# Patient Record
Sex: FEMALE | Race: WHITE
[De-identification: names, ages, dates, MRNs, and addresses within clinical notes are randomized per-mention and may not be internally consistent; named-entity substitution may affect disease eponyms.]

---

## 2017-07-06 ENCOUNTER — HOSPITAL ENCOUNTER (OUTPATIENT)
Dept: HOSPITAL 62 - OD | Age: 82
End: 2017-07-06
Attending: PHYSICIAN ASSISTANT
Payer: MEDICARE

## 2017-07-06 DIAGNOSIS — M10.9: Primary | ICD-10-CM

## 2017-07-06 NOTE — RADIOLOGY REPORT (SQ)
EXAM DESCRIPTION:  FOOT RIGHT COMPLETE



COMPLETED DATE/TIME:  7/6/2017 5:07 pm



REASON FOR STUDY:  GOUT, UNSPECIFIED M10.9  GOUT, UNSPECIFIED



COMPARISON:  None.



NUMBER OF VIEWS:  Three views.



TECHNIQUE:  AP, lateral and oblique  radiographic images acquired of the right foot.



LIMITATIONS:  None.



FINDINGS:  MINERALIZATION: Normal.

BONES: Marginal erosion along the medial base right great toe proximal phalanx characteristic for gou
t.  No fracture

JOINTS: 1st metatarsophalangeal joint space narrowing.

SOFT TISSUES: Subcentimeter faintly radiopaque tophus along the medial aspect right 1st metatarsophal
angeal joint marked with an arrow on the AP view

OTHER: No other significant finding.



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



TECHNICAL DOCUMENTATION:  JOB ID:  [id number]

 2011 [employer]- All Rights Reserved

## 2019-02-06 ENCOUNTER — HOSPITAL ENCOUNTER (EMERGENCY)
Dept: HOSPITAL 62 - ER | Age: 84
Discharge: HOME | End: 2019-02-06
Payer: MEDICARE

## 2019-02-06 VITALS — SYSTOLIC BLOOD PRESSURE: 125 MMHG | DIASTOLIC BLOOD PRESSURE: 75 MMHG

## 2019-02-06 DIAGNOSIS — I44.7: ICD-10-CM

## 2019-02-06 DIAGNOSIS — R07.9: ICD-10-CM

## 2019-02-06 DIAGNOSIS — M54.9: ICD-10-CM

## 2019-02-06 DIAGNOSIS — J10.1: Primary | ICD-10-CM

## 2019-02-06 DIAGNOSIS — I48.91: ICD-10-CM

## 2019-02-06 DIAGNOSIS — R05: ICD-10-CM

## 2019-02-06 DIAGNOSIS — I10: ICD-10-CM

## 2019-02-06 LAB
A TYPE INFLUENZA AG: POSITIVE
ADD MANUAL DIFF: NO
ALBUMIN SERPL-MCNC: 3.7 G/DL (ref 3.5–5)
ALP SERPL-CCNC: 55 U/L (ref 38–126)
ALT SERPL-CCNC: 27 U/L (ref 9–52)
ANION GAP SERPL CALC-SCNC: 8 MMOL/L (ref 5–19)
AST SERPL-CCNC: 30 U/L (ref 14–36)
B INFLUENZA AG: NEGATIVE
BASOPHILS # BLD AUTO: 0 10^3/UL (ref 0–0.2)
BASOPHILS NFR BLD AUTO: 0.4 % (ref 0–2)
BILIRUB DIRECT SERPL-MCNC: 0.1 MG/DL (ref 0–0.4)
BILIRUB SERPL-MCNC: 0.5 MG/DL (ref 0.2–1.3)
BUN SERPL-MCNC: 12 MG/DL (ref 7–20)
CALCIUM: 9.5 MG/DL (ref 8.4–10.2)
CHLORIDE SERPL-SCNC: 101 MMOL/L (ref 98–107)
CO2 SERPL-SCNC: 26 MMOL/L (ref 22–30)
EOSINOPHIL # BLD AUTO: 0.1 10^3/UL (ref 0–0.6)
EOSINOPHIL NFR BLD AUTO: 1 % (ref 0–6)
ERYTHROCYTE [DISTWIDTH] IN BLOOD BY AUTOMATED COUNT: 13.1 % (ref 11.5–14)
GLUCOSE SERPL-MCNC: 109 MG/DL (ref 75–110)
HCT VFR BLD CALC: 38.1 % (ref 36–47)
HGB BLD-MCNC: 13.1 G/DL (ref 12–15.5)
LYMPHOCYTES # BLD AUTO: 0.5 10^3/UL (ref 0.5–4.7)
LYMPHOCYTES NFR BLD AUTO: 7.7 % (ref 13–45)
MCH RBC QN AUTO: 31.1 PG (ref 27–33.4)
MCHC RBC AUTO-ENTMCNC: 34.3 G/DL (ref 32–36)
MCV RBC AUTO: 91 FL (ref 80–97)
MONOCYTES # BLD AUTO: 0.7 10^3/UL (ref 0.1–1.4)
MONOCYTES NFR BLD AUTO: 11.9 % (ref 3–13)
NEUTROPHILS # BLD AUTO: 4.9 10^3/UL (ref 1.7–8.2)
NEUTS SEG NFR BLD AUTO: 79 % (ref 42–78)
PLATELET # BLD: 179 10^3/UL (ref 150–450)
POTASSIUM SERPL-SCNC: 4.4 MMOL/L (ref 3.6–5)
PROT SERPL-MCNC: 6.3 G/DL (ref 6.3–8.2)
RBC # BLD AUTO: 4.21 10^6/UL (ref 3.72–5.28)
SODIUM SERPL-SCNC: 135.1 MMOL/L (ref 137–145)
TOTAL CELLS COUNTED % (AUTO): 100 %
WBC # BLD AUTO: 6.1 10^3/UL (ref 4–10.5)

## 2019-02-06 PROCEDURE — 96360 HYDRATION IV INFUSION INIT: CPT

## 2019-02-06 PROCEDURE — 80048 BASIC METABOLIC PNL TOTAL CA: CPT

## 2019-02-06 PROCEDURE — 99283 EMERGENCY DEPT VISIT LOW MDM: CPT

## 2019-02-06 PROCEDURE — 87040 BLOOD CULTURE FOR BACTERIA: CPT

## 2019-02-06 PROCEDURE — 71046 X-RAY EXAM CHEST 2 VIEWS: CPT

## 2019-02-06 PROCEDURE — 87804 INFLUENZA ASSAY W/OPTIC: CPT

## 2019-02-06 PROCEDURE — 84484 ASSAY OF TROPONIN QUANT: CPT

## 2019-02-06 PROCEDURE — 85025 COMPLETE CBC W/AUTO DIFF WBC: CPT

## 2019-02-06 PROCEDURE — 93010 ELECTROCARDIOGRAM REPORT: CPT

## 2019-02-06 PROCEDURE — 96361 HYDRATE IV INFUSION ADD-ON: CPT

## 2019-02-06 PROCEDURE — 93005 ELECTROCARDIOGRAM TRACING: CPT

## 2019-02-06 PROCEDURE — 83605 ASSAY OF LACTIC ACID: CPT

## 2019-02-06 PROCEDURE — 36415 COLL VENOUS BLD VENIPUNCTURE: CPT

## 2019-02-06 PROCEDURE — 80076 HEPATIC FUNCTION PANEL: CPT

## 2019-02-06 NOTE — ER DOCUMENT REPORT
ED General





- General


Chief Complaint: Chest Pain


Stated Complaint: CHEST PAIN


Time Seen by Provider: 02/06/19 09:57


Primary Care Provider: 


PETE MIKE PA [Primary Care Provider] - Follow up as needed


TRAVEL OUTSIDE OF THE [place] IN LAST 30 DAYS: No





- HPI


Notes: 





Patient is a 84-year-old female that presents to the emergency department for 

chief complaint of cough, chest pain and back pain.


Patient reports on Monday she choked while eating, later that evening she 

started coughing.  She states she has had increasing coughing and congestion 

since then.  She denied any known fevers but was told she was febrile when she 

was here today.  Patient states she tried to see her doctor yesterday for the 

symptoms but was unable to get an appointment.  She reports an aching pain in 

her diffuse anterior chest and posterior upper back worse with coughing.  She 

states the pain significantly improves when she is not coughing.  She denies any

nausea, vomiting, abdominal pain.  She did not get an influenza vaccine this 

year.  She denies any sick contacts.  Patient's family is also concerned that 

for the last month she has had decreased mobility because of gout in both of her

feet.  She is currently taking her allopurinol for the gout.  Patient has not 

taken any of her morning medications yet today.





Past Medical History: Hypertension, atrial fibrillation





Past Surgical History: Right arm fracture repair





Social History: Denies drugs alcohol and tobacco





Family History: Reviewed and noncontributory for presenting illness





Allergies: Reviewed, see documented allergy list.








REVIEW OF SYSTEMS:





CONSTITUTIONAL : 





No fever





 chills





No diaphoresis





No recent illness





EENT:





No vision changes





congestion





No sore throat  





CARDIOVASCULAR:





 chest pain





No palpitations





RESPIRATORY:





shortness of breath





cough





difficulty breathing





GASTROINTESTINAL: 





No abdominal pain





No nausea





No vomiting





No diarrhea





GENITOURINARY:





No dysuria





No hematuria





No difficulty urinating





MUSCULOSKELETAL:





back pain





No leg pain





No arm pain





SKIN:  





No rashes





No lesions





LYMPHATIC: 





No swollen, enlarged glands.





NEUROLOGICAL: 





No lightheadedness





No headache





No weakness





No paresthesias





PSYCHIATRIC:





No anxiety





No depression 








PHYSICAL EXAMINATION:





Vital signs reviewed, nursing noted reviewed.





GENERAL: Well-appearing, well-nourished and in no acute distress.





HEAD: Atraumatic, normocephalic.





EYES: Eyes appear normal, extraocular movements intact, sclera anicteric, 

conjunctiva are normal.





ENT: nares patent, oropharynx clear without exudates.  Mildly dry mucous 

membranes.





NECK: Normal range of motion, supple without lymphadenopathy





LUNGS: Breath sounds diminished to auscultation bilaterally and equal.  No 

wheezes rales or rhonchi.





HEART: Irregularly irregular and tachycardic rhythm without murmurs, +2/4 

bilateral radial and DP pulses





ABDOMEN: Soft, nontender, normoactive bowel sounds.  No rebound, guarding, or 

rigidity. No masses appreciated.





EXTREMITIES: Nontender, good range of motion, no pitting or edema. 





NEUROLOGICAL: No focal neurological deficits. Moves all extremities 

spontaneously Motor and sensory grossly intact on exam.  Mild diffuse tenderness

to palpation of her feet bilaterally with no focal erythema or edema.





PSYCH: Normal mood, normal affect.





SKIN: Warm, Dry, normal turgor, no rashes or lesions noted on exposed skin











- Related Data


Allergies/Adverse Reactions: 


                                        





No Known Allergies Allergy (Verified 02/06/19 08:08)


   











Past Medical History





- Social History


Smoking Status: Unknown if Ever Smoked


Family History: Reviewed & Not Pertinent


Patient has suicidal ideation: No


Patient has homicidal ideation: No





- Past Medical History


Cardiac Medical History: Reports: Hx Hypertension


   Denies: Hx Heart Attack


Pulmonary Medical History: 


   Denies: Hx Asthma


Neurological Medical History: Denies: Hx Cerebrovascular Accident, Hx Seizures


Renal/ Medical History: Denies: Hx Peritoneal Dialysis


GI Medical History: Denies: Hx Hepatitis, Hx Ulcer


Musculoskeletal Medical History: 


Infectious Medical History: Denies: Hx Hepatitis


Past Surgical History: Denies: Hx Mastectomy, Hx Open Heart Surgery, Hx 

Pacemaker





- Immunizations


Hx Diphtheria, Pertussis, Tetanus Vaccination: Yes





Physical Exam





- Vital signs


Vitals: 


                                        











Temp Pulse Resp BP Pulse Ox


 


 101.4 F H  100   16   151/71 H  100 


 


 02/06/19 08:09  02/06/19 08:09  02/06/19 08:09  02/06/19 08:09  02/06/19 08:09














Course





- Re-evaluation


Re-evalutation: 





02/06/19 10:33


Vitals reviewed.  Nursing notes reviewed.  Patient is tachycardic and febrile.  

She will be given IV fluids and Tylenol for symptom medic management.


02/06/19 12:32





Laboratory











  02/06/19 02/06/19 02/06/19





  09:44 09:44 09:44


 


WBC  6.1  


 


RBC  4.21  


 


Hgb  13.1  


 


Hct  38.1  


 


MCV  91  


 


MCH  31.1  


 


MCHC  34.3  


 


RDW  13.1  


 


Plt Count  179  


 


Seg Neutrophils %  79.0 H  


 


Lymphocytes %  7.7 L  


 


Monocytes %  11.9  


 


Eosinophils %  1.0  


 


Basophils %  0.4  


 


Absolute Neutrophils  4.9  


 


Absolute Lymphocytes  0.5  


 


Absolute Monocytes  0.7  


 


Absolute Eosinophils  0.1  


 


Absolute Basophils  0.0  


 


Sodium   Cancelled 


 


Potassium   Cancelled 


 


Chloride   Cancelled 


 


Carbon Dioxide   Cancelled 


 


Anion Gap   Cancelled 


 


BUN   Cancelled 


 


Creatinine   Cancelled 


 


Est GFR ( Amer)   Cancelled 


 


Est GFR (Non-Af Amer)   Cancelled 


 


Glucose   Cancelled 


 


Lactic Acid   


 


Calcium   Cancelled 


 


Total Bilirubin   Cancelled 


 


Direct Bilirubin   Cancelled 


 


Neonat Total Bilirubin   Cancelled 


 


Neonat Direct Bilirubin   Cancelled 


 


Neonat Indirect Bili   Cancelled 


 


AST   Cancelled 


 


ALT   Cancelled 


 


Alkaline Phosphatase   Cancelled 


 


Troponin I    < 0.012


 


Total Protein   Cancelled 


 


Albumin   Cancelled 


 


Influenza A (Rapid)   


 


Influenza B (Rapid)   














  02/06/19 02/06/19 02/06/19





  10:20 10:20 10:51


 


WBC   


 


RBC   


 


Hgb   


 


Hct   


 


MCV   


 


MCH   


 


MCHC   


 


RDW   


 


Plt Count   


 


Seg Neutrophils %   


 


Lymphocytes %   


 


Monocytes %   


 


Eosinophils %   


 


Basophils %   


 


Absolute Neutrophils   


 


Absolute Lymphocytes   


 


Absolute Monocytes   


 


Absolute Eosinophils   


 


Absolute Basophils   


 


Sodium    135.1 L


 


Potassium    4.4


 


Chloride    101


 


Carbon Dioxide    26


 


Anion Gap    8


 


BUN    12


 


Creatinine    1.00


 


Est GFR ( Amer)    > 60


 


Est GFR (Non-Af Amer)    53 L


 


Glucose    109


 


Lactic Acid  0.9  


 


Calcium    9.5


 


Total Bilirubin    0.5


 


Direct Bilirubin    0.1


 


Neonat Total Bilirubin    Not Reportable


 


Neonat Direct Bilirubin    Not Reportable


 


Neonat Indirect Bili    Not Reportable


 


AST    30


 


ALT    27


 


Alkaline Phosphatase    55


 


Troponin I   


 


Total Protein    6.3


 


Albumin    3.7


 


Influenza A (Rapid)   POSITIVE 


 


Influenza B (Rapid)   NEGATIVE 











                                        





Chest X-Ray  02/06/19 09:57


IMPRESSION:  Cardiomegaly without acute abnormality of the lungs.  No focal 

airspace opacity.


 








Patient reevaluated.  She is oxygenating well on room air and in no respiratory 

distress.  Her heart rate has been varying between the upper 90s-130s.  She is 

in atrial fibrillation which is chronic for her.  Patient will be given a dose 

of her Bystolic for further heart rate control.  She was offered admission to 

the hospital for telemetry monitoring of her tachycardia but has declined.  She 

is otherwise hemodynamically stable.  Her workup shows influenza a however she 

is outside the window for Tamiflu.  She was counseled on symptomatic management 

including hydration and Tylenol at home.  She was encouraged to follow with her 

primary care doctor tomorrow for reevaluation of her tachycardia.  She will also

monitor her heart rate at home and will return if she is persistently tachycard

ic greater than 120 or if she becomes symptomatic including palpitations, 

shortness of breath and lightheadedness.  Patient's family is in agreement with 

plan of care and will assist her this evening.  She is stable at discharge.





- Vital Signs


Vital signs: 


                                        











Temp Pulse Resp BP Pulse Ox


 


 101.4 F H  100   16   151/71 H  100 


 


 02/06/19 08:09  02/06/19 08:09  02/06/19 08:09  02/06/19 08:09  02/06/19 08:09














- Laboratory


Result Diagrams: 


                                 02/06/19 09:44





                                 02/06/19 10:51


Laboratory results interpreted by me: 


                                        











  02/06/19 02/06/19





  09:44 10:51


 


Seg Neutrophils %  79.0 H 


 


Lymphocytes %  7.7 L 


 


Sodium   135.1 L


 


Est GFR (Non-Af Amer)   53 L














- EKG Interpretation by Me


Additional EKG results interpreted by me: 





02/06/19 10:33


Interpreted by myself


0815: Atrial fibrillation with RVR, rate 100, normal axis, left bundle branch 

block





Discharge





- Discharge


Clinical Impression: 


 Influenza A





Condition: Stable


Disposition: HOME, SELF-CARE


Instructions:  Influenza ([facility]) [id number]


Additional Instructions: 


Please return to the emergency department if you have any worsening, or concern 

of your symptoms.





Please return to the emergency department if you develop chest pain, difficulty 

breathing, severe abdominal pain, or ongoing vomiting.





Please follow-up with your primary care physician tomorrow.





If prescribed, take all medications as directed. 





If you have any questions or concerns do not hesitate to return the emergency 

department for evaluation.





Your heart rate was elevated today because of fever and your atrial 

fibrillation.  You were given your dose of Bystolic in the emergency room and do

not need to take this home medication again this evening.  Please check your 

heart rate at home and if you are having heart rates consistently above 120-130 

you should return to the emergency room.  Return to the emergency room also if 

you develop any palpitations, increasing shortness of breath, lightheadedness or

difficulty breathing.





Referrals: 


PETE MIKE PA [Primary Care Provider] - Follow up tomorrow

## 2019-02-06 NOTE — EKG REPORT
SEVERITY:- ABNORMAL ECG -

ATRIAL FIBRILLATION, V-RATE 

LEFT BUNDLE BRANCH BLOCK

:

Confirmed by: Carolyn Spencer 06-Feb-2019 21:04:31

## 2019-02-06 NOTE — RADIOLOGY REPORT (SQ)
EXAM DESCRIPTION:  CHEST 2 VIEWS



COMPLETED DATE/TIME:  2/6/2019 11:23 am



REASON FOR STUDY:  cough



COMPARISON:  None.



EXAM PARAMETERS:  NUMBER OF VIEWS: two views

TECHNIQUE: Digital Frontal and Lateral radiographic views of the chest acquired.

RADIATION DOSE: NA

LIMITATIONS: none



FINDINGS:  LUNGS AND PLEURA: No opacities, masses or pneumothorax. No pleural effusion.

MEDIASTINUM AND HILAR STRUCTURES: No masses or contour abnormalities.

HEART AND VASCULAR STRUCTURES: Cardiomegaly.

BONES: Partially imaged plate and screw fixation of the proximal right humerus.

HARDWARE: None in the chest.

OTHER: No other significant finding.



IMPRESSION:  Cardiomegaly without acute abnormality of the lungs.  No focal airspace opacity.



TECHNICAL DOCUMENTATION:  JOB ID:  [id number]

 2011 [employer]- All Rights Reserved



Reading location - IP/workstation name: [email or web address]

## 2019-10-17 ENCOUNTER — HOSPITAL ENCOUNTER (EMERGENCY)
Dept: HOSPITAL 62 - ER | Age: 84
Discharge: LEFT BEFORE DISCHARGE | End: 2019-10-17
Payer: MEDICARE

## 2019-10-17 VITALS — DIASTOLIC BLOOD PRESSURE: 66 MMHG | SYSTOLIC BLOOD PRESSURE: 181 MMHG

## 2019-10-17 DIAGNOSIS — R51: ICD-10-CM

## 2019-10-17 DIAGNOSIS — Z53.21: Primary | ICD-10-CM

## 2019-10-17 DIAGNOSIS — Z79.01: ICD-10-CM

## 2019-10-17 LAB
ADD MANUAL DIFF: NO
ALBUMIN SERPL-MCNC: 4.4 G/DL (ref 3.5–5)
ALP SERPL-CCNC: 62 U/L (ref 38–126)
ANION GAP SERPL CALC-SCNC: 9 MMOL/L (ref 5–19)
APTT BLD: 38.8 SEC (ref 23.5–35.8)
AST SERPL-CCNC: 34 U/L (ref 14–36)
BASOPHILS # BLD AUTO: 0.1 10^3/UL (ref 0–0.2)
BASOPHILS NFR BLD AUTO: 0.7 % (ref 0–2)
BILIRUB DIRECT SERPL-MCNC: 0.2 MG/DL (ref 0–0.4)
BILIRUB SERPL-MCNC: 0.4 MG/DL (ref 0.2–1.3)
BUN SERPL-MCNC: 19 MG/DL (ref 7–20)
CALCIUM: 11 MG/DL (ref 8.4–10.2)
CHLORIDE SERPL-SCNC: 102 MMOL/L (ref 98–107)
CO2 SERPL-SCNC: 28 MMOL/L (ref 22–30)
EOSINOPHIL # BLD AUTO: 0.1 10^3/UL (ref 0–0.6)
EOSINOPHIL NFR BLD AUTO: 1.5 % (ref 0–6)
ERYTHROCYTE [DISTWIDTH] IN BLOOD BY AUTOMATED COUNT: 13.4 % (ref 11.5–14)
GLUCOSE SERPL-MCNC: 91 MG/DL (ref 75–110)
HCT VFR BLD CALC: 40.8 % (ref 36–47)
HGB BLD-MCNC: 13.8 G/DL (ref 12–15.5)
INR PPP: 1.62
LYMPHOCYTES # BLD AUTO: 1.6 10^3/UL (ref 0.5–4.7)
LYMPHOCYTES NFR BLD AUTO: 21.9 % (ref 13–45)
MCH RBC QN AUTO: 31.8 PG (ref 27–33.4)
MCHC RBC AUTO-ENTMCNC: 33.7 G/DL (ref 32–36)
MCV RBC AUTO: 94 FL (ref 80–97)
MONOCYTES # BLD AUTO: 0.6 10^3/UL (ref 0.1–1.4)
MONOCYTES NFR BLD AUTO: 8.8 % (ref 3–13)
NEUTROPHILS # BLD AUTO: 4.9 10^3/UL (ref 1.7–8.2)
NEUTS SEG NFR BLD AUTO: 67.1 % (ref 42–78)
PLATELET # BLD: 199 10^3/UL (ref 150–450)
POTASSIUM SERPL-SCNC: 4.4 MMOL/L (ref 3.6–5)
PROT SERPL-MCNC: 7.6 G/DL (ref 6.3–8.2)
PROTHROMBIN TIME: 19.4 SEC (ref 11.4–15.4)
RBC # BLD AUTO: 4.33 10^6/UL (ref 3.72–5.28)
TOTAL CELLS COUNTED % (AUTO): 100 %
WBC # BLD AUTO: 7.3 10^3/UL (ref 4–10.5)

## 2019-10-17 PROCEDURE — 85025 COMPLETE CBC W/AUTO DIFF WBC: CPT

## 2019-10-17 PROCEDURE — 85730 THROMBOPLASTIN TIME PARTIAL: CPT

## 2019-10-17 PROCEDURE — 80053 COMPREHEN METABOLIC PANEL: CPT

## 2019-10-17 PROCEDURE — 36415 COLL VENOUS BLD VENIPUNCTURE: CPT

## 2019-10-17 PROCEDURE — 85610 PROTHROMBIN TIME: CPT

## 2019-10-17 PROCEDURE — 70450 CT HEAD/BRAIN W/O DYE: CPT

## 2019-10-17 NOTE — RADIOLOGY REPORT (SQ)
EXAM DESCRIPTION:  CT HEAD WITHOUT



COMPLETED DATE/TIME:  10/17/2019 6:34 pm



REASON FOR STUDY:  Fall hit head on Eliquis



COMPARISON:  None.



TECHNIQUE:  Axial images acquired through the brain without intravenous contrast.  Images reviewed wi
th bone, brain and subdural windows.  Additional sagittal and coronal reconstructions were generated.
 Images stored on PACS.

All CT scanners at this facility use dose modulation, iterative reconstruction, and/or weight based d
osing when appropriate to reduce radiation dose to as low as reasonably achievable (ALARA).

[facility]: Dose Right  [facility]: CareDose    [facility]: Dose Right    [facility]: Teradose 4D    [facility]: [employer]



RADIATION DOSE:  CT Rad equipment meets quality standard of care and radiation dose reduction techniq
ues were employed. CTDIvol: 53.2 mGy. DLP: 964 mGy-cm. mGy.



LIMITATIONS:  None.



FINDINGS:  VENTRICLES: Prominent ventricles secondary to involutional atrophy.

CEREBRUM: No masses.  No hemorrhage.  No midline shift.  No evidence for acute infarction. Normal gra
y/white matter differentiation. No areas of low density in the white matter.

CEREBELLUM: No masses.  No hemorrhage.  No alteration of density.  No evidence for acute infarction.

EXTRAAXIAL SPACES: No abnormal fluid collections.  Empty sella.  No masses.

ORBITS AND GLOBE: No intra- or extraconal masses.  Normal contour of globe without masses.  Cataract 
surgery.

CALVARIUM: No fracture.

PARANASAL SINUSES: Right maxillary mucous retention cyst.

SOFT TISSUES: No mass or hematoma.

OTHER: No other significant finding.



IMPRESSION:  NO ACUTE INTRACRANIAL IMAGING FINDINGS.

EVIDENCE OF ACUTE STROKE: NO.



COMMENT:  Quality ID # 436: Final reports with documentation of one or more dose reduction techniques
 (e.g., Automated exposure control, adjustment of the mA and/or kV according to patient size, use of 
iterative reconstruction technique)



TECHNICAL DOCUMENTATION:  JOB ID:  [id number]

 2011 [employer]- All Rights Reserved



Reading location - IP/workstation name: [email or web address]

## 2019-10-17 NOTE — RADIOLOGY REPORT (SQ)
EXAM DESCRIPTION:  HAND LEFT 3 VIEWS



COMPLETED DATE/TIME:  10/17/2019 6:16 pm



REASON FOR STUDY:  Fall injury to left hand and left hip



COMPARISON:  None.



EXAM PARAMETERS:  NUMBER OF VIEWS: Three views.

TECHNIQUE: AP, lateral and oblique  radiographic images acquired of the left hand.

LIMITATIONS: None.



FINDINGS:  MINERALIZATION: Normal.

BONES: No acute fracture or dislocation.  No worrisome bone lesions.

JOINTS: No effusions.  Osteoarthritic degenerative changes of the interphalangeal and 1st CMC joints.


SOFT TISSUES: No soft tissue swelling.  No foreign body.

OTHER: No other significant finding.



IMPRESSION:  NO RADIOGRAPHIC EVIDENCE OF ACUTE INJURY.



TECHNICAL DOCUMENTATION:  JOB ID:  [id number]

 2011 [employer]- All Rights Reserved



Reading location - IP/workstation name: [email or web address]

## 2019-10-17 NOTE — ER DOCUMENT REPORT
ED Medical Screen (RME)





- General


Chief Complaint: Fall


Stated Complaint: FALL/HEAD PAIN


Time Seen by Provider: 10/17/19 17:39


Primary Care Provider: 


PETE MIKE PA [Primary Care Provider] - Follow up as needed


Mode of Arrival: Wheelchair


Information source: Patient


Notes: 





85-year-old female presented to ED for fall landing on her hip hand and her 

head.  She is on Eliquis.  She was to her primary care doctor yesterday her 

heart rate is running in the 50s.  She states she has a Baker's cyst in the 

opposite knee and has had pain in the opposite hip but not on the side.  She 

states she also had no feeling in the right hip at the time she fell.




















I have greeted and performed a rapid initial assessment of this patient.  A 

comprehensive ED assessment and evaluation of the patient, analysis of test 

results and completion of medical decision making process will be conducted by 

an additional ED providers.


TRAVEL OUTSIDE OF THE [place] IN LAST 30 DAYS: No





- Related Data


Allergies/Adverse Reactions: 


                                        





No Known Allergies Allergy (Verified 02/06/19 08:08)


   











Past Medical History





- Past Medical History


Cardiac Medical History: Reports: Hx Hypertension


   Denies: Hx Heart Attack


Pulmonary Medical History: 


   Denies: Hx Asthma


Neurological Medical History: Denies: Hx Cerebrovascular Accident, Hx Seizures


Renal/ Medical History: Denies: Hx Peritoneal Dialysis


GI Medical History: Denies: Hx Hepatitis, Hx Ulcer


Musculoskeltal Medical History: 


Infectious Medical History: Denies: Hx Hepatitis


Past Surgical History: Denies: Hx Mastectomy, Hx Open Heart Surgery, Hx 

Pacemaker





- Immunizations


Hx Diphtheria, Pertussis, Tetanus Vaccination: Yes





Physical Exam





- Vital signs


Vitals: 





                                        











Temp Pulse Resp BP Pulse Ox


 


 97.5 F   55 L  16   181/66 H  97 


 


 10/17/19 17:36  10/17/19 17:36  10/17/19 17:36  10/17/19 17:36  10/17/19 17:36














Course





- Vital Signs


Vital signs: 





                                        











Temp Pulse Resp BP Pulse Ox


 


 97.5 F   55 L  16   181/66 H  97 


 


 10/17/19 17:36  10/17/19 17:36  10/17/19 17:36  10/17/19 17:36  10/17/19 17:36














Doctor's Discharge





- Discharge


Referrals: 


RASHID,PETE, PA [Primary Care Provider] - Follow up as needed

## 2019-10-17 NOTE — RADIOLOGY REPORT (SQ)
EXAM DESCRIPTION:  KNEE RIGHT 4 VIEWS



COMPLETED DATE/TIME:  10/17/2019 6:16 pm



REASON FOR STUDY:  FALL



COMPARISON:  Right knee radiographs 8/7/2008



NUMBER OF VIEWS:  Four views.



TECHNIQUE:  AP, lateral, and both oblique radiographic images acquired of the right knee.



LIMITATIONS:  None.



FINDINGS:  MINERALIZATION: Normal.

BONES: No acute fracture or dislocation.  No worrisome bone lesions.

JOINT: No effusion.  Mild osteoarthritis.

SOFT TISSUES: No soft tissue swelling.  No radio-opaque foreign body.

OTHER: No other significant finding.



IMPRESSION:  NO RADIOGRAPHIC EVIDENCE OF ACUTE INJURY.



TECHNICAL DOCUMENTATION:  JOB ID:  [id number]

 2011 [employer]- All Rights Reserved



Reading location - IP/workstation name: [email or web address]

## 2019-12-27 ENCOUNTER — HOSPITAL ENCOUNTER (OUTPATIENT)
Dept: HOSPITAL 62 - OD | Age: 84
End: 2019-12-27
Attending: PHYSICIAN ASSISTANT
Payer: MEDICARE

## 2019-12-27 DIAGNOSIS — R05: Primary | ICD-10-CM

## 2019-12-27 LAB
ADD MANUAL DIFF: NO
BASOPHILS # BLD AUTO: 0 10^3/UL (ref 0–0.2)
BASOPHILS NFR BLD AUTO: 0.8 % (ref 0–2)
EOSINOPHIL # BLD AUTO: 0.2 10^3/UL (ref 0–0.6)
EOSINOPHIL NFR BLD AUTO: 3.4 % (ref 0–6)
ERYTHROCYTE [DISTWIDTH] IN BLOOD BY AUTOMATED COUNT: 13.5 % (ref 11.5–14)
HCT VFR BLD CALC: 37.8 % (ref 36–47)
HGB BLD-MCNC: 12.8 G/DL (ref 12–15.5)
LYMPHOCYTES # BLD AUTO: 1.9 10^3/UL (ref 0.5–4.7)
LYMPHOCYTES NFR BLD AUTO: 31.8 % (ref 13–45)
MCH RBC QN AUTO: 31.8 PG (ref 27–33.4)
MCHC RBC AUTO-ENTMCNC: 33.9 G/DL (ref 32–36)
MCV RBC AUTO: 94 FL (ref 80–97)
MONOCYTES # BLD AUTO: 0.5 10^3/UL (ref 0.1–1.4)
MONOCYTES NFR BLD AUTO: 8.3 % (ref 3–13)
NEUTROPHILS # BLD AUTO: 3.3 10^3/UL (ref 1.7–8.2)
NEUTS SEG NFR BLD AUTO: 55.7 % (ref 42–78)
PLATELET # BLD: 181 10^3/UL (ref 150–450)
RBC # BLD AUTO: 4.03 10^6/UL (ref 3.72–5.28)
TOTAL CELLS COUNTED % (AUTO): 100 %
WBC # BLD AUTO: 5.9 10^3/UL (ref 4–10.5)

## 2019-12-27 PROCEDURE — 85025 COMPLETE CBC W/AUTO DIFF WBC: CPT

## 2019-12-27 PROCEDURE — 71046 X-RAY EXAM CHEST 2 VIEWS: CPT

## 2019-12-27 PROCEDURE — 36415 COLL VENOUS BLD VENIPUNCTURE: CPT

## 2019-12-27 NOTE — RADIOLOGY REPORT (SQ)
EXAM DESCRIPTION:  CHEST PA/LATERAL



COMPLETED DATE/TIME:  12/27/2019 1:42 pm



REASON FOR STUDY:  COUGH



COMPARISON:  2/6/2019



EXAM PARAMETERS:  NUMBER OF VIEWS: two views

TECHNIQUE: Digital Frontal and Lateral radiographic views of the chest acquired.

RADIATION DOSE: NA

LIMITATIONS: none



FINDINGS:  LUNGS AND PLEURA: No opacities, masses or pneumothorax. No pleural effusion.

MEDIASTINUM AND HILAR STRUCTURES: No masses or contour abnormalities.

HEART AND VASCULAR STRUCTURES: Cardiomegaly is present.  There is no pulmonary edema.

BONES: No acute findings.

HARDWARE: None in the chest.

OTHER: No other significant finding.



IMPRESSION:  Cardiomegaly without pulmonary edema.



TECHNICAL DOCUMENTATION:  JOB ID:  [id number]

 2011 [employer]- All Rights Reserved



Reading location - IP/workstation name: [email or web address]

## 2020-01-09 ENCOUNTER — HOSPITAL ENCOUNTER (EMERGENCY)
Dept: HOSPITAL 62 - ER | Age: 85
LOS: 2 days | Discharge: OTHER INSTITUTION | End: 2020-01-11
Payer: MEDICARE

## 2020-01-09 DIAGNOSIS — R07.9: ICD-10-CM

## 2020-01-09 DIAGNOSIS — R68.83: ICD-10-CM

## 2020-01-09 DIAGNOSIS — I44.7: ICD-10-CM

## 2020-01-09 DIAGNOSIS — I10: ICD-10-CM

## 2020-01-09 DIAGNOSIS — R00.2: Primary | ICD-10-CM

## 2020-01-09 LAB
ADD MANUAL DIFF: NO
ALBUMIN SERPL-MCNC: 4.3 G/DL (ref 3.5–5)
ALP SERPL-CCNC: 56 U/L (ref 38–126)
ANION GAP SERPL CALC-SCNC: 10 MMOL/L (ref 5–19)
AST SERPL-CCNC: 33 U/L (ref 14–36)
BASOPHILS # BLD AUTO: 0 10^3/UL (ref 0–0.2)
BASOPHILS NFR BLD AUTO: 0.7 % (ref 0–2)
BILIRUB DIRECT SERPL-MCNC: 0.2 MG/DL (ref 0–0.4)
BILIRUB SERPL-MCNC: 0.5 MG/DL (ref 0.2–1.3)
BUN SERPL-MCNC: 16 MG/DL (ref 7–20)
CALCIUM: 10.8 MG/DL (ref 8.4–10.2)
CHLORIDE SERPL-SCNC: 103 MMOL/L (ref 98–107)
CK MB SERPL-MCNC: 3.45 NG/ML (ref ?–4.55)
CK SERPL-CCNC: 106 U/L (ref 30–135)
CO2 SERPL-SCNC: 27 MMOL/L (ref 22–30)
EOSINOPHIL # BLD AUTO: 0.1 10^3/UL (ref 0–0.6)
EOSINOPHIL NFR BLD AUTO: 2.6 % (ref 0–6)
ERYTHROCYTE [DISTWIDTH] IN BLOOD BY AUTOMATED COUNT: 14 % (ref 11.5–14)
GLUCOSE SERPL-MCNC: 99 MG/DL (ref 75–110)
HCT VFR BLD CALC: 39.8 % (ref 36–47)
HGB BLD-MCNC: 13.4 G/DL (ref 12–15.5)
LYMPHOCYTES # BLD AUTO: 1.5 10^3/UL (ref 0.5–4.7)
LYMPHOCYTES NFR BLD AUTO: 25.5 % (ref 13–45)
MCH RBC QN AUTO: 32 PG (ref 27–33.4)
MCHC RBC AUTO-ENTMCNC: 33.8 G/DL (ref 32–36)
MCV RBC AUTO: 95 FL (ref 80–97)
MONOCYTES # BLD AUTO: 0.5 10^3/UL (ref 0.1–1.4)
MONOCYTES NFR BLD AUTO: 8.5 % (ref 3–13)
NEUTROPHILS # BLD AUTO: 3.6 10^3/UL (ref 1.7–8.2)
NEUTS SEG NFR BLD AUTO: 62.7 % (ref 42–78)
PLATELET # BLD: 189 10^3/UL (ref 150–450)
POTASSIUM SERPL-SCNC: 4.6 MMOL/L (ref 3.6–5)
PROT SERPL-MCNC: 7.6 G/DL (ref 6.3–8.2)
RBC # BLD AUTO: 4.21 10^6/UL (ref 3.72–5.28)
TOTAL CELLS COUNTED % (AUTO): 100 %
TROPONIN I SERPL-MCNC: < 0.012 NG/ML
WBC # BLD AUTO: 5.7 10^3/UL (ref 4–10.5)

## 2020-01-09 PROCEDURE — 80053 COMPREHEN METABOLIC PANEL: CPT

## 2020-01-09 PROCEDURE — 93005 ELECTROCARDIOGRAM TRACING: CPT

## 2020-01-09 PROCEDURE — 84484 ASSAY OF TROPONIN QUANT: CPT

## 2020-01-09 PROCEDURE — 82553 CREATINE MB FRACTION: CPT

## 2020-01-09 PROCEDURE — 85025 COMPLETE CBC W/AUTO DIFF WBC: CPT

## 2020-01-09 PROCEDURE — 36415 COLL VENOUS BLD VENIPUNCTURE: CPT

## 2020-01-09 PROCEDURE — 71046 X-RAY EXAM CHEST 2 VIEWS: CPT

## 2020-01-09 PROCEDURE — 93010 ELECTROCARDIOGRAM REPORT: CPT

## 2020-01-09 PROCEDURE — 99285 EMERGENCY DEPT VISIT HI MDM: CPT

## 2020-01-09 PROCEDURE — 82550 ASSAY OF CK (CPK): CPT

## 2020-01-09 NOTE — RADIOLOGY REPORT (SQ)
EXAM DESCRIPTION:  CHEST 2 VIEWS



COMPLETED DATE/TIME:  1/9/2020 3:43 pm



REASON FOR STUDY:  chest pain



COMPARISON:  12/27/2019



EXAM PARAMETERS:  NUMBER OF VIEWS: two views

TECHNIQUE: Digital Frontal and Lateral radiographic views of the chest acquired.

RADIATION DOSE: NA

LIMITATIONS: none



FINDINGS:  LUNGS AND PLEURA: No opacities, masses or pneumothorax. No pleural effusion.

MEDIASTINUM AND HILAR STRUCTURES: No masses or contour abnormalities.

HEART AND VASCULAR STRUCTURES: Cardiomegaly.  No donna pulmonary edema.

BONES: No acute findings.

HARDWARE: None in the chest.

OTHER: No other significant finding.



IMPRESSION:  Cardiomegaly without pulmonary edema.



TECHNICAL DOCUMENTATION:  JOB ID:  [id number]

 2011 [employer]- All Rights Reserved



Reading location - IP/workstation name: [email or web address]

## 2020-01-09 NOTE — ER DOCUMENT REPORT
ED Cardiac





- General


Chief Complaint: Chest Pain


Stated Complaint: CHEST PAIN


Time Seen by Provider: 01/09/20 15:13


Primary Care Provider: 


PETE MIKE PA [Primary Care Provider] - Follow up as needed


TRAVEL OUTSIDE OF THE [place] IN LAST 30 DAYS: Yes





- HPI


Notes: 





85-year-old female to the emergency department with history of atrial 

fibrillation and hypertension with complaints of 10 days of nighttime chest pain

that has gotten progressively worse, uncontrolled high blood pressure, and 

palpitations.  She states that every night she has left-sided chest pain that 

radiates down her left arm with a sensation that her heart is racing.  She takes

medication for her history of atrial fib and is on Bystolic and several other 

blood pressure medicines.  She was on amiodarone in February of this past year 

but has come off of that because she has had thyroid issues from it.  She states

that she is also noted that her blood pressures been in the 200s systolic.  She 

called her cardiologist in [place], Dr. Cristina with [facility], on this past Tuesday and they started her on hydralazine twice a day. 

Patient states that she has been faithful in the medicine but she has not really

seen much of an improvement of her blood pressure.  She continues to have the 

chest pain every night.  She states that the chest pain seems to get a little 

bit better during the day but for the first time today she had an episode of 

chest pain with her daughter while they were driving in the car.  The medics 

were called.  They gave her nitro glycerin and she is currently chest pain-free.

 She denies any diaphoresis or shortness of breath with it.  She states that she

is on Eliquis.  She is never had a heart attack.  She denies any leg swelling.  

She is never had a DVT.  Patient and her family tell me that they called Dr. Cristina after this episode of chest pain and Dr. Cristina after initial assessment 

here would like her transferred to [place] for further evaluation of the chest pain 

and palpitations.  Patient also admits fleeting episodes of dizziness but denies

any syncope.





- Related Data


Allergies/Adverse Reactions: 


                                        





No Known Allergies Allergy (Verified 10/17/19 18:06)


   











Past Medical History





- General


Information source: Patient, Relative





- Social History


Smoking Status: Never Smoker


Frequency of alcohol use: None


Drug Abuse: None


Lives with: Alone


Family History: Hypertension


Patient has suicidal ideation: No


Patient has homicidal ideation: No





- Past Medical History


Cardiac Medical History: Reports: Hx Hypertension


   Denies: Hx Heart Attack


Pulmonary Medical History: 


   Denies: Hx Asthma


Neurological Medical History: Denies: Hx Cerebrovascular Accident, Hx Seizures


Renal/ Medical History: Denies: Hx Peritoneal Dialysis


GI Medical History: Denies: Hx Hepatitis, Hx Ulcer


Musculoskeletal Medical History: 


Infectious Medical History: Denies: Hx Hepatitis


Past Surgical History: Denies: Hx Mastectomy, Hx Open Heart Surgery, Hx 

Pacemaker





- Immunizations


Hx Diphtheria, Pertussis, Tetanus Vaccination: Yes





Review of Systems





- Review of Systems


Constitutional: Chills.  denies: Fever


EENT: No symptoms reported


Cardiovascular: Chest pain, Palpitations, Heart racing, Dizziness.  denies: 

Orthopnea, Dyspnea, Syncope, Lightheaded, Edema


Respiratory: denies: Cough, Short of breath


Gastrointestinal: denies: Abdominal pain, Nausea, Vomiting


Musculoskeletal: No symptoms reported


Skin: No symptoms reported


Hematologic/Lymphatic: No symptoms reported


Neurological/Psychological: No symptoms reported


-: Yes All other systems reviewed and negative





Physical Exam





- Vital signs


Vitals: 


                                        











Temp


 


 97.8 F 


 


 01/09/20 14:41











Interpretation: Hypertensive





- General


General appearance: Appears well, Alert


In distress: None





- HEENT


Head: Normocephalic, Atraumatic


Eyes: Normal


Pupils: PERRL





- Respiratory


Respiratory status: No respiratory distress


Chest status: Nontender.  No: Accessory muscle use


Breath sounds: Normal.  No: Rales, Rhonchi, Stridor, Wheezing


Chest palpation: Normal





- Cardiovascular


Rhythm: Regular


Heart sounds: Normal auscultation


Murmur: No


Notes: 





No leg edema





- Abdominal


Inspection: Normal


Distension: No distension


Bowel sounds: Normal


Tenderness: Nontender


Organomegaly: No organomegaly





- Back


Back: Normal, Nontender





- Extremities


General upper extremity: Normal inspection, Nontender, Normal color, Normal ROM,

Normal temperature


General lower extremity: Normal inspection, Nontender, Normal color, Normal ROM,

Normal temperature, Normal weight bearing.  No: Ilana's sign





- Neurological


Neuro grossly intact: Yes


Cognition: Normal


Orientation: AAOx4


[place] Coma Scale Eye Opening: Spontaneous


[place] Coma Scale Verbal: Oriented


[place] Coma Scale Motor: Obeys Commands


Emile Coma Scale Total: 15


Speech: Normal


Cranial nerves: Normal


Cerebellar coordination: Normal.  No: Gait ataxia


Motor strength normal: LUE, RUE, LLE, RLE


Additional motor exam normals: Equal .  No: Pronator drift


Sensory: Normal





- Psychological


Associated symptoms: Normal affect, Normal mood





- Skin


Skin Temperature: Warm


Skin Moisture: Dry


Skin Color: Normal





Course





- Re-evaluation


Re-evalutation: 


Discussed patient with Dr. Gross, ER attending.  She agrees with plan for 

admission to patient's cardiology group in [place] at [facility].  She suggest 

sublingual


01/09/20 


Impression: Chest pain and palpitations.  Have placed a page out to [facility] for 

transfer of the patient per her request and for continuity of care with her 

cardiologist.  Patient has been accepted to Dr. Bridger Kc's service.  She 

continues to be chest pain-free.  We will await bed assignment at [facility] and 

transport at that time.  Family has been updated and they agree with the plan.














- Vital Signs


Vital signs: 


                                        











Temp Pulse Resp BP Pulse Ox


 


 97.8 F      19   199/76 H  98 


 


 01/09/20 17:36     01/09/20 19:01  01/09/20 19:00  01/09/20 19:01














- Laboratory


Result Diagrams: 


                                 01/09/20 13:45





                                 01/09/20 13:45


Laboratory results interpreted by me: 


                                        











  01/09/20





  13:45


 


Est GFR (MDRD) Non-Af  52 L


 


Calcium  10.8 H














- Diagnostic Test


Radiology reviewed: Image reviewed, Reports reviewed





- EKG Interpretation by Me


Additional EKG results interpreted by me: 





01/09/20 








Rate 52, rhythm: sinus, interpretation: Left bundle branch block, no STEMI.  

Left bundle branch block is unchanged from prior on February 6, 2019





Discharge





- Discharge


Clinical Impression: 


 Chest pain, Palpitations, Hypertension





Condition: Stable


Disposition: OTHER


Referrals: 


PETE MIKE PA [Primary Care Provider] - Follow up as needed

## 2020-01-09 NOTE — EKG REPORT
SEVERITY:- ABNORMAL ECG -

SINUS RHYTHM

IVCD, CONSIDER ATYPICAL LBBB

:

Confirmed by: Carolyn Spencer 09-Jan-2020 21:50:08

## 2020-01-10 RX ADMIN — LISINOPRIL SCH: 10 TABLET ORAL at 21:42

## 2020-01-10 RX ADMIN — LISINOPRIL SCH: 10 TABLET ORAL at 21:44

## 2020-01-10 NOTE — ER DOCUMENT REPORT
Doctor's Note


Notes: 





01/10/20 15:57


The patient's medications based on the pharmacy reported list, were ordered.  

The patient did have Bystolic 20 mg ordered but it was last filled July 12, 2019.  Her blood pressure is not running high, so I did not order the Bystolic.


The patient is still waiting for bed assignment at [facility].





01/10/20 16:57


With the initial medication orders I spoke with the patient, she has had no pain

today.  She reports that she has not taken nitroglycerin on any of the episodes 

of nocturnal chest pain.  She did receive nitroglycerin yesterday afternoon from

[facility] for her daytime chest pain she was having, and states that it helped relieve

the pain almost immediately.


I reviewed her medications with her, and it turns out that she is says she 

filled her Bystolic prescription in December 2019, and that she currently takes 

20 mg in the morning and 10 mg at bedtime.

## 2020-01-11 VITALS — SYSTOLIC BLOOD PRESSURE: 151 MMHG | DIASTOLIC BLOOD PRESSURE: 55 MMHG

## 2020-11-12 ENCOUNTER — HOSPITAL ENCOUNTER (OUTPATIENT)
Dept: HOSPITAL 62 - RAD | Age: 85
End: 2020-11-12
Attending: PHYSICIAN ASSISTANT
Payer: MEDICARE

## 2020-11-12 DIAGNOSIS — N18.1: Primary | ICD-10-CM

## 2020-11-12 PROCEDURE — 76770 US EXAM ABDO BACK WALL COMP: CPT

## 2020-11-12 NOTE — RADIOLOGY REPORT (SQ)
EXAM DESCRIPTION:  U/S RETROPERITON (RENAL/AORTA)



IMAGES COMPLETED DATE/TIME:  11/12/2020 12:54 pm



REASON FOR STUDY:  CKD STAGE 1 N18.1  CHRONIC KIDNEY DISEASE, STAGE 1



COMPARISON:  None.



TECHNIQUE:  Dynamic and static grayscale images acquired of the kidneys and bladder and recorded on P
ACS. Additional selected color Doppler and spectral images recorded.



LIMITATIONS:  None.



FINDINGS:  RIGHT KIDNEY: Normal size, 11 cm. Normal echogenicity. No solid or suspicious masses. No h
ydronephrosis. No calcifications.

LEFT KIDNEY:  Normal size, 12.2 cm. Normal echogenicity. No solid or suspicious masses. No hydronephr
osis. No calcifications.

BLADDER: No masses.  Ureteral jets are not seen.

OTHER FINDINGS: No other significant finding.



IMPRESSION:  NORMAL RENAL AND BLADDER ULTRASOUND.



TECHNICAL DOCUMENTATION:  JOB ID:  [id number]

 2011 [employer]- All Rights Reserved



Reading location - IP/workstation name: [email or web address]

## 2020-11-13 NOTE — XMS REPORT
Patient Summary Document

                          Created on:2020



Patient:ANGELICA WALKER

Sex:Female

:1934

External Reference #:[id number]





Demographics







                          Address                   [address]



                                                    [place] [address]

 

                          Home Phone                [phone]

 

                          Work Phone                [phone]

 

                          Mobile Phone              [phone]

 

                          Email Address             [email or web address]

 

                          Preferred Language        English

 

                          Marital Status            Unknown

 

                          Adventism Affiliation     Unknown

 

                          Race                      Unknown

 

                          Additional Race(s)        [id number]



                                                    Unavailable



                                                    Unavailable

 

                          Ethnic Group              Unknown









Author







                          Organization              [facility]

 

                          Address                   [facility] [address]



                                                    [place] [address]

 

                          Phone                     [phone]









Care Team Providers







                    Name                Role                Phone

 

                    MERCEDES Rosales           Primary Care Physician Unavailable

 

                    SHIRIN                Attending Clinician Unavailable

 

                    MERCEDES ANTHONY        Attending Clinician Unavailable

 

                    PHIL KURTZ  Attending Clinician Unavailable

 

                    SHIRIN                Admitting Clinician Unavailable

 

                    KEREN DANIEL          Admitting Clinician Unavailable









Allergies, Adverse Reactions, Alerts







        Allergy Name Allergy Status  Severity Reaction(s) Onset   Inactive Treat

ing 

Comments



                Type                            Date    Date    Clinician 

 

        AMLODIPINE Drug    Active  U                                 



                allergy                                             



                                                00:00:                  



                                                00                      

 

        Amlodipine Propensity Active          Other (See                  

 Ankle/ped



                to adverse                 Comments)                     al



                reactions                         00:00:                  edemaA

nkl



                                                00                      e/pedal



                                                                        edema

 

        Hydrochlorot Propensity Active  Low     Rash                      



        hiazide to adverse                                             



                reactions                         00:00:                  



                                                00                      

 

        Hydrocodone Propensity Active  Low     Other (See 2014                

  



                to adverse                 Comments) 2                    



                reactions                         00:00:                  



                                                00                      







Medications







       Ordered Filled Start  Stop   Current Ordering Indication Dosage Frequency

 Signature

                          Comments                  Components



      Medication Medication Date  Date  Medication? Clinician                   

(SIG)       



      Name  Name                                                        

 

      apixaban       -0       Yes         Atrial 5mg         Take 1 Take 1 



      (ELIQUIS) 5       7-24                    fibrillatio             tablet (

5 tablet (5 



      mg Tab       00:00:                   n,                mg total) mg total

) 



                  00                      unspecified             by mouth by mo

uth 



                                          type              Two (2) Two (2) 



                                          (CMS-HCC)             times a times a 



                                                            day.  day.  

 

      apixaban       -0 2020- No          Atrial 5mg         Take 1 Take 1 



      (ELIQUIS) 5       1-29  07-24             fibrillatio             tablet (

5 tablet (5 



      mg Tab       00:00: 00:00             n,                mg total) mg total

) 



                  00    :00               unspecified             by mouth by mo

uth 



                                          type              Two (2) Two (2) 



                                          (CMS-HCC)             times a times a 



                                                            day.  day.  

 

      hydrALAZINE              Yes               25mg        Take 1 Take 1

 



      (APRESOLINE       1-07                                      tablet (25 tab

let 



      ) 25 MG       00:00:                                     mg total) (25 mg 



      tablet       00                                        by mouth total) by 



                                                            two (2) mouth two 



                                                            times a (2) times 



                                                            day.  a day. 

 

      lisinopril       2019       Yes         Essential             TAKE ONE T

JOSE ONE 



      (PRINIVIL,Z       2-23                    hypertensio             TABLET B

Y TABLET BY 



      ESTRIL) 20       00:00:                   n                 MOUTH MOUTH 



      MG tablet       00                                        TWICE A TWICE A 



                                                            DAY   DAY   

 

      simvastatin       2019       Yes         Carotid 20mg        Take 1 Take

 1 



      (ZOCOR) 20       2-18                    atheroscler             tablet (2

0 tablet 



      MG tablet       00:00:                   osis, left             mg total) 

(20 mg 



                  00                                        by mouth total) by 



                                                            nightly. mouth 



                                                                  nightly. 

 

      ELIQUIS 5       2019- No          Atrial             TAKE ONE TAKE 

ONE 



      mg Tab       0-30  01-29             fibrillatio             TABLET BY TAB

LET BY 



                  00:00: 00:00             n,                MOUTH MOUTH 



                  00    :00               unspecified             TWICE A TWICE 

A 



                                          type              DAY   DAY   



                                          (CMS-HCC)                         

 

      allopurinol              Yes               300mg       Take 300 Take

 300 



      (ZYLOPRIM)       9-30                                      mg by mg by 



      300 MG       00:00:                                     mouth mouth 



      tablet       00                                        daily. daily. 

 

      lisinopril              No          Essential             TAKE ONE T

JOSE ONE 



      (PRINIVIL,Z       9-                    hypertensio             TABLET B

Y TABLET BY 



      ESTRIL) 20       00:00:                   n                 MOUTH MOUTH 



      MG tablet       00                                        TWICE A TWICE A 



                                                            DAY   DAY   

 

      CALCIUM              Yes                           Take by Take by 



      CARBONATE       3-07                                      mouth mouth 



      (CALCIUM       10:06:                                     daily. daily. 



      500 ORAL)       31                                                    

 

      amiodarone        2019- No                200mg       Take 1 Take 1 



      (PACERONE)       3-07  10-07                               tablet tablet 



      200 MG       00:00: 00:00                               (200 mg (200 mg 



      tablet       00    :00                                 total) by total) by

 



                                                            mouth Two mouth Two 



                                                            (2) times (2) times 



                                                            a day. a day. 

 

      allopurinol       2019- No                            allopurino   

    



      (ZYLOPRIM)       1-14  10-07                               l           



      100 MG       00:00: 00:00                               (ZYLOPRIM)       



      tablet       00    :00                                 100 MG       



                                                            tablet       

 

      simvastatin       2018- No          Carotid 20mg        Take 1 Take

 1 



      (ZOCOR) 20       2-03  12-18             atheroscler             tablet (2

0 tablet 



      MG tablet       00:00: 00:00             osis, left             mg total) 

(20 mg 



                  00    :00                                 by mouth total) by 



                                                            every mouth 



                                                            other day. every 



                                                                  other 



                                                                  day.  

 

      apixaban              No          Atrial 5mg         Take 1 Take 1 



      (ELIQUIS) 5                           fibrillatio             tablet (

5 tablet (5 



      mg Tab       00:00:                   n,                mg total) mg total

) 



                  00                      unspecified             by mouth by mo

uth 



                                          type              Two (2) Two (2) 



                                          (CMS-HCC)             times a times a 



                                                            day.  day.  

 

      furosemide              Yes         Essential 20mg        Take 1 Yong

e 1 



      (LASIX) 20                           hypertensio             tablet (2

0 tablet 



      MG tablet       00:00:                   n                 mg total) (20 m

g 



                  00                                        by mouth total) by 



                                                            daily as mouth 



                                                            needed. daily as 



                                                                  needed. 

 

      lisinopril              No          Essential 20mg        Take 1 Yong

e 1 



      (PRINIVIL,Z                           hypertensio             tablet (

20 tablet 



      ESTRIL) 20       00:00:                   n                 mg total) (20 

mg 



      MG tablet       00                                        by mouth total) 

by 



                                                            Two (2) mouth Two 



                                                            times a (2) times 



                                                            day.  a day. 

 

      nebivolol              Yes         Essential 20mg        Take 1 Take

 1 



      (BYSTOLIC)                           hypertensio             tablet (2

0 tablet 



      20 mg Tab       00:00:                   n                 mg total) (20 m

g 



                  00                                        by mouth total) by 



                                                            daily. mouth 



                                                                  daily. 

 

      apixaban              No          Atrial 5mg         Take 1 Take 1 



      (ELIQUIS) 5                           fibrillatio             tablet (

5 tablet (5 



      mg Tab       00:00:                   n,                mg total) mg total

) 



                  00                      unspecified             by mouth by mo

uth 



                                          type              Two (2) Two (2) 



                                          (CMS-HCC)             times a times a 



                                                            day.  day.  

 

      nebivolol              No          Essential 20mg        Take 1 Take

 1 



      (BYSTOLIC)                           hypertensio             tablet (2

0 tablet 



      20 mg Tab       00:00:                   n                 mg total) (20 m

g 



                  00                                        by mouth total) by 



                                                            daily. mouth 



                                                                  daily. 

 

      nebivolol        2018- No          Essential 20MG        Take 1 Take

 1 



      (BYSTOLIC)       9             hypertensio             tablet (2

0 tablet 



      20 mg Tab       00:00: 00:00             n                 mg total) (20 m

g 



                  00    :00                                 by mouth total) by 



                                                            daily. mouth 



                                                                  daily. 

 

      nebivolol       2018- No          Essential 20MG        Take 1 Take

 1 



      (BYSTOLIC)                    hypertensio             tablet (2

0 tablet 



      20 mg Tab       00:00: 00:00             n                 mg total) (20 m

g 



                  00    :00                                 by mouth total) by 



                                                            daily. mouth 



                                                                  daily. 

 

      nebivolol       2018- No          Essential 20MG        Take 1 Take

 1 



      (BYSTOLIC)                    hypertensio             tablet (2

0 tablet 



      20 mg Tab       00:00: 00:00             n                 mg total) (20 m

g 



                  00    :00                                 by mouth total) by 



                                                            daily. mouth 



                                                                  daily. 

 

      simvastatin              No          Carotid 20MG        Take 1 Take

 1 



      (ZOCOR) 20                           atheroscler             tablet (2

0 tablet 



      MG tablet       00:00:                   osis, left             mg total) 

(20 mg 



                  00                                        by mouth total) by 



                                                            every mouth 



                                                            other day. every 



                                                                  other 



                                                                  day.  

 

      apixaban       2018- No          Atrial 5MG         Take 1 Take 1 



      (ELIQUIS) 5                    fibrillatio             tablet (

5 tablet (5 



      mg Tab       00:00: 00:00             n,                mg total) mg total

) 



                  00    :00               unspecified             by mouth by mo

uth 



                                          type              Two (2) Two (2) 



                                          (CMS-[facility])             times a times a 



                                                            day.  day.  

 

      lisinopril       2018- No          Essential 20MG        Take 1 Yong

e 1 



      (PRINIVIL,Z                    hypertensio             tablet (

20 tablet 



      ESTRIL) 20       00:00: 00:00             n                 mg total) (20 

mg 



      MG tablet       00    :00                                 by mouth total) 

by 



                                                            Two (2) mouth Two 



                                                            times a (2) times 



                                                            day.  a day. 

 

      furosemide       2018- No          Essential 20MG        Take 1 Yong

e 1 



      (LASIX) 20                    hypertensio             tablet (2

0 tablet 



      MG tablet       00:00: 00:00             n                 mg total) (20 m

g 



                  00    :00                                 by mouth total) by 



                                                            daily as mouth 



                                                            needed. daily as 



                                                                  needed. 

 

      nebivolol       2018- No          Essential 20MG        Take 1 Take

 1 



      (BYSTOLIC)                    hypertensio             tablet (2

0 tablet 



      20 mg Tab       00:00: 00:00             n                 mg total) (20 m

g 



                  00    :00                                 by mouth total) by 



                                                            daily. mouth 



                                                                  daily. 

 

      pantoprazol       2019- No          Gastroesoph 40MG        Take 1 

Take 1 



      e           407  -07             ageal             tablet (40 tablet 



      (PROTONIX)       00:00: 00:00             reflux             mg total) (40

 mg 



      40 MG       00    :00               disease             by mouth total) by

 



      tablet                               without             daily at mouth 



                                          esophagitis             0600. daily at

 



                                                                  0600. 

 

      multivitami              No                1{tbl}       Take 1 Take 

1 



      n           5-24                                      tablet by tablet by 



      (THERAGRAN)       10:30:                                     mouth mouth 



      per tablet       06                                        daily. daily. 

 

      CALCIUM              No                            Take by Take by 



      CARBONATE       2-09                                      mouth mouth 



      (CALCIUM       09:12:                                     daily. daily. 



      500 ORAL)       07                                                    

 

      alendronate              Yes               70mg        Take 70 mg Ta

ke 70 



      (FOSAMAX)       -                                      by mouth mg by 



      70 MG       00:00:                                     every mouth 



      tablet       00                                        seven (7) every 



                                                            days. seven (7) 



                                                                  days. 

 

      multivitami                   Yes               1{tbl}       Take 1 Take 1

 



      n                                                     tablet by tablet by 



      (THERAGRAN)                                                 mouth mouth 



      per tablet                                                 daily. daily. 







Problems







        Condition Condition Condition Status  Onset   Resolution Last    Treatin

g Comments



        Name    Details Category         Date    Date    Treatment Clinician 



                                                        Date            

 

        Chronic Chronic [id number] Active  2019-1          2019-10-07         



        anticoagula anticoagula                 0-07            13:08:06        

 



        tion    tion                    00:00:                          



                                        00                              

 

        Pure    Pure    [id number] Active  2019-1          2019-10-07         



        hypercholes hypercholes                 0            13:08:08        

 



        terolemia terolemia                 00:00:                          



                                        00                              

 

        Atrial  Atrial  Condition Inactiv 2016         



        fibrillatio fibrillatio         e       4-15            11:06:01        

 



        n       n                       00:00:                          



                                        00                              

 

        PAF     PAF     [id number] Active  2016-0          2019-10-07         



        (paroxysmal (paroxysmal                 4-15            13:08:14        

 



        atrial  atrial                  00:00:                          



        fibrillatio fibrillatio                 00                              



        n)      n)                                                      

 

        Chest pain Chest pain Condition Active  2014            11:43:20         



                                        00:00:                          



                                        00                              

 

        GERD    GERD    Condition Active  2014         



        (gastroesop (gastroesop                             11:43:27        

 



        hageal  hageal                  00:00:                          



        reflux  reflux                  00                              



        disease) disease)                                                 

 

        Hypertensio Hypertensio Condition Active  2014    

     



        n       n                       -            11:43:08         



                                        00:00:                          



                                        00                              







Procedures







                Procedure       Date / Time Performed Performing Clinician Devic

e

 

                ECHOCARDIOGRAM W COLORFLOW 2020 15:42:32 Alden Veliz 



                SPECTRAL DOPPLER                                 

 

                LIPID PANEL     2019 15:21:00 Francine Rosales 







Results







           Test Description Test Time  Test Comments Text Results Atomic Results

 Result 

Comments









                NUCLEAR         2020                                      

                          [facility]        11:32:00                          [address] [facility] [address] [phone] 



                                                                 



                                                                

--------------------------------------------------------------------------------

--------------------                    



                                                                ----------------

------------------------------------------------    Patient: 

 ANGELICA WALKER                       



                                                    Birth:  1934   Sex: F 

 Address: [address] [place] [address]   Phone: [phone] 



                                                                [place] 

[address]          Acct #: [id number]     Unit #: [id number]   

REQ SEQ #: [id number]                   



                                                                  Location:  [facility]   Room #: [id number]   Ordering: RADHA DANIEL MD  

Diagnosis: CHEST PAIN                   



                                                                 



                                                                

--------------------------------------------------------------------------------

--------------------                    



                                                                ----------------

------------------------------------------------      0304-

0003 NM/CARDIAC STRESS                  



                                                                TEST LEXISCAN: 0

3/04/20    Lexiscan sestamibi stress    Indication: Chest 

pain    Referring                       



                                                                physician: Dr. VERO armstrong     Ms. Walker presents in a fasting state.  Informed 

consent is obtained.  The               



                                                                patient is monit

ored with continuous EKG and noninvasive blood pressure.    

EKG at rest shows sinus                 



                                                                rhythm with left

 bundle branch block.  0.4 mg Lexiscan was given 

intravenously over 10 seconds and       



                                                                followed by sali

ne flush.  Sestamibi was then given intravenous and also 

followed by saline flush.               



                                                                After infusion p

atient was asymptomatic with no EKG changes from baseline.   

Resting heart rate of                   



                                                                54 Bpm increased

 to 70 Bpm, blood pressure decreased from 170/54 to 127/62 

after infusion.     There               



                                                                are no changes o

n the EKG portion of study with pharmacological stress.      

Nuclear images of the                   



                                                                myocardium were 

obtained using cine motion and tomographic technique.  Rest 

and stress images were                  



                                                                obtained under n

ormal protocol with patient received 12.7 mCi and 31.3 mCi 

oftechnetium 99m sestamibi              



                                                                intravenously fo

r the rest and stress images respectively.  Left ventricular 

ejection fraction was                   



                                                                calculated under

 standard rest imagery.     Left ventricular end diastolic 

volume 106 cc, left                     



                                                                ventricular end-

systolic volume 27 cc, left ventricular ejection fraction 

75%. TID 1.16    Procedure              



                                                                was not gated.  

  Review of the polar mapping suggest a moderate defect 

involving the anterior septal           



                                                                wall which is fi

xed with trivial reversibility.  Review of the sliced images 

on grayscale and color                  



                                                                do not clearlysh

ow the anterior septal defect, old septal infarct/fixed 

defect cannot be excluded.              



                                                                Impression:  No 

evidence of ischemia/reversibility  The left ventricle is 

normal size with normal                 



                                                                systolic functio

n  Left bundle branch block unchanged throughout the study  

Cannot exclude old septal               



                                                                infarct/scar  No

 old studies on file for comparison            Signed by: 

ROBBI MARKS MD 20                 



                                                    1132    cc: ROBBI MARKS MD, ROBERTO P MD 

 

                RADIOLOGY       2020                                      

                          [facility]                                    



                                10:10:00                          [address] [place] [address]               



                                                                                

  [phone] 



                                                                 



                                                                

--------------------------------------------------------------------------------

--------------------                    



                                                                ----------------

------------------------------------------------    Patient: 

 ANGELICA WALKER                       



                                                    Birth:  1934   Sex: F 

 Address: [address] [place] [address]   Phone: [phone] 



                                                                [place] 

[address]          Acct #: [id number]     Unit #: [id number]   

REQ SEQ #: [id number]                   



                                                                  Location:  [facility]   Room #: 3-6118-P   Ordering: RADHA DANIEL MD  

Diagnosis: CHEST PAIN                   



                                                                 



                                                                

--------------------------------------------------------------------------------

--------------------                    



                                                                ----------------

------------------------------------------------       KUB - 

1 VIEW ABD                              



                                                                History: CHEST P

AIN    CHEST PAIN SS Constipation/obstipation       Supine 

view of the abdomen                     



                                                                demonstrates a n

onobstructive bowel gas pattern. Organ shadows are 

unremarkable. Bony   structures         



                                                                are unremarkable

. There are no significant calcifications. There is prominent

retained stool which                    



                                                                may be   suggest

epifanio of constipation.      Impression: Prominent retained 

stool otherwise nonacute                



                                                                abdomen.      Fi

nal report electronically signed by: Vicky Merrill MD       

   Signed by: VICKY MERRILL II, MD  1006    cc: RADHA DANIEL MD, II,VICKY TERAN MD                                      

 

                RADIOLOGY       2020                                      

                          [facility]                                    



                                19:10:00                          [address] [facility] [address] [phone] 



                                                                 



                                                                

--------------------------------------------------------------------------------

--------------------                    



                                                                ----------------

------------------------------------------------    Patient: 

 ANGELICA WALKER                       



                                                    Birth:  1934   Sex: F 

 Address: [address] [place] [address]   Phone: [phone] 



                                                                [place] 

[address]          Acct #: [id number]     Unit #: [id number]   

[facility] SEQ #: [id number]                   



                                                                  Location:  [facility] 

  Room #:    Ordering: ALFONSO MCCORMACK DO  Diagnosis: CHEST PAIN

                                        



                                                                 



                                                                

--------------------------------------------------------------------------------

--------------------                    



                                                                ----------------

------------------------------------------------      Exam: 

Single view of the chest.               



                                                                  Date of exam: 

3/3/2020 processed at 1848      Indications: Left-sided chest

pain, burping, back                     



                                                                pain      Compar

alan: None.      Findings: Single view of the chest shows the

lungs to be free of any                 



                                                                focal infiltrate

s or pleural effusions. There is no   pulmonary vascular 

congestion or pneumothorax.             



                                                                The cardiac and 

mediastinal silhouettes are unremarkable in appearance.   

There are no abnormalities              



                                                                within the visua

lized bony thorax. Partially visualized orthopedic hardware 

projecting over   the                   



                                                                proximal right h

umerus.      Impression: No acute pulmonary process.      

Final report electronically             



                                                                signed by: Chichi Love MD           Signed by: JENNIFER LOVE MD 

20 1426    cc:                    



                                                    ALFONSO MCCORMACK ROS EMARY H MD 

 

                #[id number]       2020                      Echocardiogram W

 Colorflow Spectral Doppler (2020 

10:42 AM EST)SpecimenNarrativePerformed At 



                [id number]       10:06:00                        Transthoracic Ec

ho Report [address] [facility], [address] 

[place] [address] [phone] DUARTESHIRLEY ANGELICATIARA mon[place]                                        Exam Date:  10:06 Ordering Physician: ALDEN VELIZ) Age: 85 Gender: F Exam       



                                                                Location: [facility]

ary_Echo Referring Physician: ALDEN VELIZ) 

: 1934 Ht (in):                



                                                                68 Wt (lb): 211 

Reading Physician: Alden Veliz MD MRN: [id number] 

Sonographer: Nasrin Martinez RDCS Procedure C

PT: 40285 Indications: bradycardia, afib ICD Codes: Pt. 

History: BP: 158 / 62 HR:               



                                                                Rhythm: Sinus wi

th LBBB Technical Quality: Good IMPRESSIONS Normal global 

left ventricular size,                  



                                                                systolic functio

n with abnormal septal bounce c/w LBBB. Mild left ventricular

hypertrophy. Normal                     



                                                                left ventricular

 ejection fraction estimated at 55%. Normal right ventricular

size and function. Mild                 



                                                                mitral regurgita

tion. No aortic stenosis. No aortic regurgitation. Mild 

tricuspid regurgitation.                



                                                                Estimated PASP 4

0mmHg. No pericardial effusion. Normal inferior vena cava. 

FINDINGS Left Ventricle                 



                                                                Normal global le

ft ventricular size, systolic function with abnormal septal 

bounce c/w LBBB. Mild                   



                                                                left ventricular

 hypertrophy. Normal left ventricular ejection fraction 

estimated at 55%. Septal E/E'           



                                                                ratio is 20.5 in

dicating abnormal filling pressure. Right Ventricle Normal 

right ventricular size and              



                                                                function. Right 

Atrium Normal right atrial size. Left Atrium Mild left atrial

dilatation. Mitral                      



                                                                Valve Normal fun

ction and mobility of the mitral valve leaflets. Mitral 

annular calcification. Mild             



                                                                mitral regurgita

tion. Aortic Valve Structurally normal trileaflet aortic 

valve. No aortic stenosis.              



                                                                No aortic regurg

itation. Tricuspid Valve Structurally normal tricuspid valve.

Mild tricuspid                          



                                                                regurgitation. E

stimated PASP 40mmHg Pulmonic Valve Structurally normal 

pulmonic valve.                         



                                                                Mild-to-moderate

 pulmonic regurgitation. Pericardium Normal pericardium. No 

pericardial effusion.                   



                                                                Vessels Normal s

ize aortic root and proximal ascending aorta. Normal inferior

vena cava. MEASUREMENTS                 



                                                                     

       (Male / Female) Normal Values 2D ECHO LV 

Diastolic Diameter SARITHA                  



                                                                4.9 cm   

4.2 - 5.9 / 3 Aortic Root Diameter    2.8 cm LV 

Systolic Diameter PLAX 2.9            



                                                                cm     

      LA Systolic Diameter LX  4.1 cm   

3.0 - 4.0 / 2.7 - 3.8 cm              



                                                                IVS Diastolic Th

ickness  1.2 cm   0.6 - 1.0 / 0 LA Volume Index 

     35.7                   



                                                                cm/m 16 - 28

 cm/m LVPW Diastolic Thickness  1.2 cm   0.6 - 

1.0 / 0 Ascending Aorta               



                                                                Diameter  3.3 

cm LV Relative Wall Thicknes 0.5 DOPPLER MV Area PHT   

     3.1                    



                                                                cm    

      LV E' Septal Velocity   4.3 cm/s 

Mitral E Point Velocity               



                                                                87.0 cm/s  

       Mitral E to LV E' Septal  20.5 Mitral

A Point Velocity                      



                                                                94.7 cm/s  

       TR Peak Velocity      296.0 

cm/s Mitral E to A Ratio                



                                                                   0.9 

           TR Peak Gradient    

  35.0 mmHg MV                      



                                                                Deceleration Hugo

e    246.0 ms          Right Atrial 

Pressure   5.0 mmHg               



                                                                LV E' Lateral Ve

locity   9.0 cm/s          Pulmonary 

Artery Systolic 40.0                  



                                                                mmHg Mitral E to

 LV E' Lateral 9.7            Right

Ventricular Systoli                     



                                                                40.0 mmHg Bruc

e Keren MARTINEZ (Electronically Signed) Final Date: 2020 17:36EM RADProcedure              



                                                                NoteInterface, R

ad Results In - 2020 5:36 PM EST Transthoracic Echo 

Report [address] [facility], [address]

[address] [place] [address] [phone] ANGELICA WALKER Exam Date: 

2020 10:06 Ordering               



                                                                Physician: ALDEN VELIZ ([facility]) Age: 85 Gender: F [facility]                



                                                                Physician: ALDEN VELIZ (brigid) : 1934 Ht (in): 68 Wt (lb): 

211 Reading Physician:                  



                                                                Alden PEDERSEN

RN: [id number] Sonographer: Nasrin Martinez RDCS 

Procedure CPT: 87037                    



                                                                Indications: bra

dycardia, afib ICD Codes: Pt. History: BP: 158 / 62 HR: 

Rhythm: Sinus with LBBB                 



                                                                Technical Qualit

y: Good IMPRESSIONS Normal global left ventricular size, 

systolic function with                  



                                                                abnormal septal 

bounce c/w LBBB. Mild left ventricular hypertrophy. Normal 

left ventricular ejection               



                                                                fraction estimat

ed at 55%. Normal right ventricular size and function. Mild 

mitral regurgitation. No                



                                                                aortic stenosis.

 No aortic regurgitation. Mild tricuspid regurgitation. 

Estimated PASP 40mmHg. No               



                                                                pericardial effu

félix. Normal inferior vena cava. FINDINGS Left Ventricle 

Normal global left                      



                                                                ventricular size

, systolic function with abnormal septal bounce c/w LBBB. 

Mild left ventricular                   



                                                                hypertrophy. Nor

mal left ventricular ejection fraction estimated at 55%. 

Septal E/E' ratio is 20.5               



                                                                indicating abnor

mal filling pressure. Right Ventricle Normal right 

ventricular size and function.          



                                                                Right Atrium Nor

mal right atrial size. Left Atrium Mild left atrial 

dilatation. Mitral Valve Normal         



                                                                function and mob

ility of the mitral valve leaflets. Mitral annular 

calcification. Mild mitral              



                                                                regurgitation. A

ortic Valve Structurally normal trileaflet aortic valve. No 

aortic stenosis. No                     



                                                                aortic regurgita

tion. Tricuspid Valve Structurally normal tricuspid valve. 

Mild tricuspid                          



                                                                regurgitation. E

stimated PASP 40mmHg Pulmonic Valve Structurally normal 

pulmonic valve.                         



                                                                Mild-to-moderate

 pulmonic regurgitation. Pericardium Normal pericardium. No 

pericardial effusion.                   



                                                                Vessels Normal s

ize aortic root and proximal ascending aorta. Normal inferior

vena cava. MEASUREMENTS                 



                                                                (Male / Female) 

Normal Values 2D ECHO LV Diastolic Diameter SARITHA 4.9 cm 4.2 - 

5.9 / 3 Aortic Root                     



                                                                Diameter 2.8 cm 

LV Systolic Diameter PLAX 2.9 cm LA Systolic Diameter LX 4.1 

cm 3.0 - 4.0 / 2.7 - 3.8                



                                                                cm IVS Diastolic

 Thickness 1.2 cm 0.6 - 1.0 / 0 LA Volume Index 35.7 cm/m 

16 - 28 cm/m LVPW                   



                                                                Diastolic Thickn

ess 1.2 cm 0.6 - 1.0 / 0 Ascending Aorta Diameter 3.3 cm LV 

Relative Wall Thicknes                  



                                                                0.5 DOPPLER MV A

odin PHT 3.1 cm LV E' Septal Velocity 4.3 cm/s Mitral E 

Point Velocity 87.0 cm/s                



                                                                Mitral E to LV E

' Septal 20.5 Mitral A Point Velocity 94.7 cm/s TR Peak 

Velocity 296.0 cm/s Mitral E            



                                                                to A Ratio 0.9 T

R Peak Gradient 35.0 mmHg MV Deceleration Time 246.0 ms Right

Atrial Pressure 5.0                     



                                                                mmHg LV E' Later

al Velocity 9.0 cm/s Pulmonary Artery Systolic 40.0 mmHg 

Mitral E to LV E' Lateral               



                                                                9.7 Right Ventri

cular Systoli 40.0 mmHg Alden Veliz MD (Electronically 

Signed) Final Date: 2020 17:36Perfor

[facility] OrganizationAddressCity/State/ZipcodePhone NumberMcCurtain Memorial Hospital – [facility] 

RADMcCurtain Memorial Hospital – Idabel [id number] [facility] [place] [address] 









                #[id number] 2019 11:21:00                 









                          Test Item    Value        Reference Range Comments









                Cholesterol (test code = Cholesterol) 196 mg/dL       100- 199 m

g/dL  

 

                Triglycerides (test code = Triglycerides) 134 mg/dL       0- 149

 mg/dL    

 

                HDL (test code = HDL) 61 mg/dL        39 mg/dL        

 

                LDL Calculated (test code = LDL Calculated) 108 mg/dL       0- 9

9 mg/dL     

 

                Non-HDL Cholesterol (test code = Non-HDL Cholesterol)           

                      

 

                Chol/HDL Ratio (test code = Chol/HDL Ratio)                     

            

 

                VLDL Cholesterol Forest (test code = VLDL Cholesterol [place]) 27      

        5-40            







Encounters







        Start   End     Encounter Admission Attending Care    Care    Encounter 

ID



        Date/Time Date/Time Type    Type    Clinicians Facility Department 

 

        2020         Inpatient UR      NYDIA CARPIO [facility]  [facility]     [id number]

[id number]



        23:59:00                                                 [phone]



                                                                0

 

        2020 Outpatient                 [facility]  [id number]

[id number]



        00:00:00 00:00:00                                         

 

        2020 Outpatient                 [facility]  [id number]

[id number]



        00:00:00 00:00:00                                         

 

        2020 Inpatient ED      [facility]     [id number]

[id number]



        20:31:00 15:05:00                 NASRIN                 

 

        2020 Outpatient                 [facility]  [id number]

[id number]



        00:00:00 00:00:00                                         

 

        2020 Outpatient                 [facility]  [id number]

[id number]



        00:00:00 00:00:00                                         

 

        2020 Outpatient                 [facility]  [id number]

[id number]



        00:00:00 00:00:00                                         

 

        2020 Outpatient                 [facility]  [id number]

[id number]



        09:58:00 23:59:00                                         

 

        2020 Outpatient [facility]              [facility]     [id number]

[id number]



        09:59:00 13:03:01                                         [id number]



                                                                0

 

        2020 Outpatient                 [facility]  [id number]

[id number]



        09:59:00 10:29:00                                         

 

        2020 Outpatient                 [facility]  [id number]

[id number]



        00:00:00 00:00:00                                         

 

        2020-01-10 2020-01-10 Outpatient                 [facility]  [id number]

[id number]



        00:00:00 00:00:00                                         

 

        2020 Out[facility]                 [facility]  [id number]

[id number]



        00:00:00 00:00:00                                         

 

        2020 Outpatient                 [facility]  [id number]

[id number]



        00:00:00 00:00:00                                         

 

        2019 Outpatient                 [facility]  [id number]

[id number]



        00:00:00 00:00:00                                         

 

        2019 Outpatient                 [facility]  [id number]

[id number]



        00:00:00 00:00:00                                         

 

        2019-10-18 2019-10-18 Outpatient                 [facility]  [id number]

[id number]



        00:00:00 00:00:00                                         

 

        2019-10-07 2019-10-07 Outpatient                 [facility]  [id number]

[id number]



        09:51:41 10:55:55                                         

 

        2019-10-07 2019-10-07 Outpatient [facility]              [facility]  [facility]     [id number]

[id number]



        09:51:41 10:55:55                                         [id number]



                                                                1

 

        2019 Outpatient                 [facility]  [id number]

[id number]



        00:00:00 00:00:00                                         

 

        2019 Outpatient                 [facility]  [id number]

[id number]



        00:00:00 00:00:00                                         

 

        2019 Outpatient [facility]              [facility]     [id number]

[id number]



        00:00:00 00:00:00                                         [id number]

 

        2019 Outpatient                 [facility]  [id number]

[id number]



        00:00:00 00:00:00                                         

 

        2019 Outpatient [facility]              [facility]     [id number]

[id number]



        11:44:36 14:22:39                                         [id number]



                                                                6

 

        2019 Outpatient                 [facility]  [id number]

[id number]



        11:44:36 12:00:00                                         

 

        2019 Outpatient                 [facility]  [id number]

[id number]



        00:00:00 00:00:00                                         

 

        2019 Outpatient                 [facility]  [id number]

[id number]



        09:54:50 10:58:00                                         

 

        2019 Outpatient [facility]              [facility]     [id number]

[id number]



        09:54:50 10:58:00                                         [id number]



                                                                0

 

        2019 Outpatient                 [facility]  [id number]

[id number]



        00:00:00 00:00:00                                         

 

        2018 Outpatient                 [facility]  [id number]

[id number]



        10:11:54 11:12:05                                         

 

        2018 Outpatient                 [facility]  [id number]

[id number]



        00:00:00 00:00:00                                         

 

        2018 Outpatient                 [facility]  [id number]

[id number]



        00:00:00 00:00:00                                         

 

        2018 Outpatient                 [facility]  [id number]

[id number]



        00:00:00 00:00:00                                         

 

        2018 Outpatient [facility]              [facility]     [id number]

[id number]



        10:34:06 11:28:13                                         [id number]



                                                                6

 

        2016 Outpatient EL      [facility], [facility]  [facility]     [id number]

[id number]



        10:15:02 23:59:00                 CORINA                 [id number]



                                                                2

 

        2014 Emergency                 [facility]  [facility]     [id number]

_[id number]



        07:51:14 23:59:00                                         [id number]



                                                                4







Immunizations







           Ordered Immunization Filled Immunization Date       Status     Commen

ts   Refusal Reason



           Name       Name                                        

 

           Influenza Virus            2016 Completed             



           Vaccine, unspecified            00:00:00                         



           formulation                                             

 

           Pneumococcal            2016 Completed             



           Conjugate 13-Valent            00:00:00                         







Payers







             Payer Name   Policy Type  Policy Number Effective Date Expiration D

ate

 

             MEDICARE PART A AND              [id number]  1999 00:00:00 



             PART B                                              

 

             [employer] [facility]              [id number]   2013-10-01 00:00:00 



             SUPPLEMENTAL INSURANCE                                        







Plan of Treatment







                Planned Activity Planned Date    Details         Comments

 

                Future Scheduled Test                  [code = ]      

 

                Future Scheduled Test                  [code = ]      

 

                Future Scheduled Test                  [code = ]      

 

                Future Scheduled Test                  [code = ]      

 

                Future Scheduled Test                  [code = ]      

 

                Future Scheduled Test                  [code = ]      

 

                Future Scheduled Test                  [code = ]      

 

                Future Scheduled Test                  [code = ]      

 

                Future Scheduled Test                  [code = ]      

 

                Future Scheduled Test                  [code = ]      

 

                Future Scheduled Test                  [code = ]      

 

                Future Scheduled Test                  [code = ]      

 

                Future Scheduled Test                  [code = ]      

 

                Future Scheduled Test                  [code = ]      

 

                Future Scheduled Test                  [code = ]      

 

                Future Scheduled Test                  [code = ]      

 

                Future Scheduled Test                  [code = ]      

 

                Future Scheduled Test                  [code = ]      

 

                Future Scheduled Test                  [code = ]      

 

                Future Scheduled Test                  [code = ]      







Social History







                Social Habit    Start Date      Stop Date       Comments

 

                Tobacco smoking status [facility]IS 2019-10-07 00:00:00 2019-10-07 00:00

:00 

 

                Alcohol intake  2019-10-07 00:00:00 2019-10-07 00:00:00 

 

                Tobacco use and exposure 2019-10-07 00:00:00 2019-10-07 00:00:00

 







Vital Signs







                Vital Name      Observation Time Observation Value Comments

 

                WEIGHT          2020 04:21:00 99.6000 kg      

 

                HEIGHT          2020 04:21:00 167.404593 cm   

 

                WEIGHT          2020 18:37:00 100.8000 kg     

 

                HEIGHT          2020 18:37:00 165.264097 cm   

 

                Systolic blood pressure 2019-10-07 09:59:00 158 mm[Hg]      

 

                Diastolic blood pressure 2019-10-07 09:59:00 82 mm[Hg]       

 

                Heart rate      2019-10-07 09:59:00 52 /min         

 

                Body height     2019-10-07 09:59:00 170.2 cm        

 

                Body weight     2019-10-07 09:59:00 96.026 kg       

 

                SYSTOLIC BLOOD PRESSURE 2019 10:06:00 140 mm[Hg]      

 

                DIASTOLIC BLOOD PRESSURE 2019 10:06:00 74 mm[Hg]       

 

                HEART RATE      2019 10:06:00 82 /min         

 

                HEIGHT          2019 10:06:00 170.2 cm        

 

                WEIGHT          2019 10:06:00 97.523 kg       

 

                SYSTOLIC BLOOD PRESSURE 2018 10:22:00 128 mm[Hg]      

 

                DIASTOLIC BLOOD PRESSURE 2018 10:22:00 64 mm[Hg]       

 

                HEART RATE      2018 10:22:00 60 /min         

 

                HEIGHT          2018 10:22:00 172.7 cm        

 

                WEIGHT          2018 10:22:00 98.884 kg

## 2025-05-15 NOTE — RADIOLOGY REPORT (SQ)
EXAM DESCRIPTION:  HIP LEFT AP/LATERAL



COMPLETED DATE/TIME:  10/17/2019 6:16 pm



REASON FOR STUDY:  Fall injury to left hand and left hip



COMPARISON:  None.



NUMBER OF VIEWS:  Two views.



TECHNIQUE:  AP pelvis and additional frog-leg view of the left hip.



LIMITATIONS:  None.



FINDINGS:  MINERALIZATION: Normal.

LEFT HIP: No fracture or dislocation.  No worrisome bone lesions.

RIGHT HIP: No fracture or dislocation.  No worrisome bone lesions.

PUBIS AND ISCHIUM: No fracture.

PELVIS: No fracture.

SACRUM: No fracture or dislocation. No worrisome bone lesions.

LOWER LUMBAR SPINE: No fracture or dislocation. No worrisome bone lesions.  Degenerative changes.

SOFT TISSUES: No findings.

OTHER: No other significant finding.



IMPRESSION:  NEGATIVE STUDY OF THE LEFT HIP AND PELVIS. NO RADIOGRAPHIC EVIDENCE OF ACUTE INJURY.



TECHNICAL DOCUMENTATION:  JOB ID:  [id number]

 2011 [employer]- All Rights Reserved



Reading location - IP/workstation name: MICHELLE I have contacted this patient through My-Chart for the purpose of assisting her in scheduling AWV.